# Patient Record
Sex: FEMALE | Race: WHITE
[De-identification: names, ages, dates, MRNs, and addresses within clinical notes are randomized per-mention and may not be internally consistent; named-entity substitution may affect disease eponyms.]

---

## 2018-06-27 ENCOUNTER — HOSPITAL ENCOUNTER (OUTPATIENT)
Age: 83
End: 2018-06-27
Payer: COMMERCIAL

## 2018-06-27 DIAGNOSIS — I10: ICD-10-CM

## 2018-06-27 DIAGNOSIS — M85.851: ICD-10-CM

## 2018-06-27 DIAGNOSIS — E78.5: ICD-10-CM

## 2018-06-27 DIAGNOSIS — Z00.00: Primary | ICD-10-CM

## 2018-06-27 LAB
ALBUMIN SERPL-MCNC: 4.4 G/DL (ref 3.5–5)
ALBUMIN/GLOB SERPL: 1.5 {RATIO} (ref 1–2.8)
ALP SERPL-CCNC: 52 U/L (ref 38–126)
ALT SERPL-CCNC: 26 IU/L (ref 9–52)
BUN SERPL-MCNC: 12 MG/DL (ref 7–17)
CALCIUM SERPL-MCNC: 9.7 MG/DL (ref 8.4–10.2)
CHLORIDE SERPL-SCNC: 104 MMOL/L (ref 98–107)
CHOLEST SERPL-MCNC: 215 MG/DL (ref 140–199)
CO2 SERPL-SCNC: 30 MMOL/L (ref 22–32)
ESTIMATED GLOMERULAR FILT RATE: > 60 ML/MIN (ref 60–?)
GLOBULIN SER CALC-MCNC: 3 G/DL (ref 1.7–4.1)
GLUCOSE SERPL-MCNC: 99 MG/DL (ref 80–110)
HDLC SERPL-MCNC: 80 MG/DL (ref 40–60)
HEMOLYSIS: < 15 (ref 0–50)
POTASSIUM SERPL-SCNC: 4.3 MMOL/L (ref 3.4–5.1)
PROT SERPL-MCNC: 7.4 G/DL (ref 6.3–8.2)
SODIUM SERPL-SCNC: 143 MMOL/L (ref 137–145)
TRIGL SERPL-MCNC: 132 MG/DL (ref 35–150)

## 2018-06-27 PROCEDURE — 36415 COLL VENOUS BLD VENIPUNCTURE: CPT

## 2018-06-27 PROCEDURE — 77080 DXA BONE DENSITY AXIAL: CPT

## 2018-06-27 PROCEDURE — 80061 LIPID PANEL: CPT

## 2018-06-27 PROCEDURE — 80053 COMPREHEN METABOLIC PANEL: CPT

## 2018-11-26 ENCOUNTER — HOSPITAL ENCOUNTER (OUTPATIENT)
Age: 83
End: 2018-11-26
Payer: COMMERCIAL

## 2018-11-26 DIAGNOSIS — M17.12: ICD-10-CM

## 2018-11-26 DIAGNOSIS — M11.262: ICD-10-CM

## 2018-11-26 DIAGNOSIS — M25.562: Primary | ICD-10-CM

## 2018-11-26 PROCEDURE — 73560 X-RAY EXAM OF KNEE 1 OR 2: CPT

## 2018-11-26 NOTE — DI.RAD.S_ITS
PROCEDURE:  XR KNEE LT 1TO2V  
   
INDICATIONS:  PAIN IN LT KNEE  
   
TECHNIQUE: 2 views of the knee were acquired.    
   
COMPARISON:  None.  
   
FINDINGS:    
   
Bones:  No fractures or dislocations.  No suspicious bony lesions.  There is mild to   
moderate lateral compartment joint space narrowing, and chondrocalcinosis is present at   
both the medial and lateral compartments (more prominent laterally).  
   
Soft tissues:  No joint effusion.  No suspicious soft tissue calcifications.    
   
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   
osteoarthritis at the lateral compartment but no trauma found in no effusion or   
intra-articular loose body is seen.  
   
   
   
Dictated by: Brody Trujillo M.D. on 11/26/2018 at 15:52       
Approved by: Brody Trujillo M.D. on 11/26/2018 at 15:52

## 2019-12-11 ENCOUNTER — HOSPITAL ENCOUNTER
Age: 84
End: 2019-12-11
Payer: COMMERCIAL

## 2019-12-11 DIAGNOSIS — E78.5: ICD-10-CM

## 2019-12-11 DIAGNOSIS — I10: Primary | ICD-10-CM

## 2019-12-11 LAB
ALT SERPL-CCNC: 18 IU/L (ref ?–35)
BUN SERPL-MCNC: 14 MG/DL (ref 7–17)
CALCIUM SERPL-MCNC: 10 MG/DL (ref 8.4–10.2)
CHLORIDE SERPL-SCNC: 103 MMOL/L (ref 98–107)
CHOLEST SERPL-MCNC: 216 MG/DL (ref 140–199)
CO2 SERPL-SCNC: 30 MMOL/L (ref 22–32)
ESTIMATED GLOMERULAR FILT RATE: > 60 ML/MIN (ref 60–?)
GLUCOSE SERPL-MCNC: 98 MG/DL (ref 80–110)
HDLC SERPL-MCNC: 72 MG/DL (ref 40–60)
HEMOLYSIS: < 15 (ref 0–50)
POTASSIUM SERPL-SCNC: 4 MMOL/L (ref 3.4–5.1)
SODIUM SERPL-SCNC: 142 MMOL/L (ref 137–145)
TRIGL SERPL-MCNC: 132 MG/DL (ref 35–150)

## 2019-12-11 PROCEDURE — 80048 BASIC METABOLIC PNL TOTAL CA: CPT

## 2019-12-11 PROCEDURE — 84450 TRANSFERASE (AST) (SGOT): CPT

## 2019-12-11 PROCEDURE — 84460 ALANINE AMINO (ALT) (SGPT): CPT

## 2019-12-11 PROCEDURE — 80061 LIPID PANEL: CPT

## 2020-01-03 ENCOUNTER — HOSPITAL ENCOUNTER (EMERGENCY)
Age: 85
Discharge: HOME | End: 2020-01-03
Payer: MEDICARE

## 2020-01-03 VITALS
TEMPERATURE: 97.9 F | SYSTOLIC BLOOD PRESSURE: 148 MMHG | DIASTOLIC BLOOD PRESSURE: 69 MMHG | RESPIRATION RATE: 16 BRPM | OXYGEN SATURATION: 100 % | HEART RATE: 71 BPM

## 2020-01-03 VITALS
HEART RATE: 63 BPM | SYSTOLIC BLOOD PRESSURE: 140 MMHG | OXYGEN SATURATION: 97 % | RESPIRATION RATE: 16 BRPM | DIASTOLIC BLOOD PRESSURE: 64 MMHG

## 2020-01-03 VITALS
OXYGEN SATURATION: 97 % | DIASTOLIC BLOOD PRESSURE: 61 MMHG | HEART RATE: 65 BPM | SYSTOLIC BLOOD PRESSURE: 144 MMHG | RESPIRATION RATE: 16 BRPM

## 2020-01-03 VITALS — DIASTOLIC BLOOD PRESSURE: 67 MMHG | SYSTOLIC BLOOD PRESSURE: 151 MMHG | OXYGEN SATURATION: 8 %

## 2020-01-03 VITALS — OXYGEN SATURATION: 97 % | HEART RATE: 66 BPM

## 2020-01-03 VITALS — BODY MASS INDEX: 22.8 KG/M2

## 2020-01-03 DIAGNOSIS — M25.562: Primary | ICD-10-CM

## 2020-01-03 PROCEDURE — 99284 EMERGENCY DEPT VISIT MOD MDM: CPT

## 2020-01-03 PROCEDURE — 99283 EMERGENCY DEPT VISIT LOW MDM: CPT

## 2020-01-03 PROCEDURE — 73560 X-RAY EXAM OF KNEE 1 OR 2: CPT

## 2020-01-03 NOTE — DI.RAD.S_ITS
PROCEDURE:  XR KNEE LT 1TO2V  
   
INDICATIONS:  left knee swelling and pain  
   
TECHNIQUE: 2 views of the knee were acquired.    
   
COMPARISON:  [facility], , XR KNEE LT 1TO2V, 11/26/2018, 15:26.  
   
FINDINGS:    
   
Bones:  No fractures or dislocations.  No suspicious bony lesions.  Chondrocalcinosis.   
Mild medial compartment joint space loss.  
   
Soft tissues: Small joint effusion.  No suspicious soft tissue calcifications.    
   
IMPRESSION: CPPD arthropathy. No evidence acute bony abnormality of the left knee.  
   
If clinical suspicion and/or symptoms persist, further assessment with repeat plain   
films, or advanced imaging (e.g., CT, MRI, or bone scan) may be helpful for further   
assessment.  
   
   
   
   
   
Dictated by: Angel Celis M.D. on 1/03/2020 at 11:23       
Approved by: Angel Celis M.D. on 1/03/2020 at 11:24

## 2020-01-03 NOTE — ED_ITS
"HPI - Extremity Injury (Lower)    
<Rachel Parmer, FNP-BC - Last Filed: 01/03/20 17:28>    
General    
Chief Complaint: Extremity Injury, Lower    
Stated Complaint: L Knee Pain    
Time Seen by Provider: 01/03/20 11:06    
Source: patient and EMS    
Mode of arrival: EMS    
Limitations: no limitations    
History of Present Illness    
HPI Narrative: The patient is an 84-year-old female nonsmoker with history of   
left meniscus injury who presents with a chief complaint of left knee pain.  She  
states that she had an MRI over the summer, and found a meniscal tear.  She   
states she has a history of chronic left knee pain.  She had a period of slight   
decreased activity due to recent illness, and states that when she started   
moving around again, her left knee her worse than usual.  She states that it   
hurts on the outside of her left knee only with motion or ambulation she denies   
any bruising, falls or injury, erythema.  She denies any recent fevers.    
Related Data    
                                  Previous Rx's    
    
    
    
 Medication  Instructions  Recorded    
     
lidocaine 1 patch TOP DAILY PRN #15 each 01/03/20    
     
prednisone 40 mg PO DAILY #10 tab 01/03/20    
     
tramadol 50 mg PO Q6H PRN #7 tab 01/03/20    
    
    
    
                                    Allergies    
    
    
    
Allergy/AdvReac Type Severity Reaction Status Date / Time    
     
Sulfa (Sulfonamide Allergy Unknown  Verified 01/03/20 12:04    
    
Antibiotics)         
    
    
    
    
Review of Systems    
<Rachel Parmer, FNP-BC - Last Filed: 01/03/20 17:28>    
Review of Systems    
Narrative: GENERAL: Denies chills, fatigue, malaise, fever, sweats.     
HEENT: Denies sinus pain, ear pain, sore throat, difficulty swallowing,   
dizziness.    
RESPIRATORY: Denies dyspnea, cough, wheezing, hemoptysis, sputum.    
CARDIOVASCULAR: Denies chest pain, palpitations, orthopnea, edema,     
GASTROINTESTINAL: Denies nausea, vomiting, abdominal pain, diarrhea,   
constipation, melena.    
: Denies dysuria, frequency, incontinence, hematuria, urinary retention.    
MUSCULOSKELETAL:  See HPI    
SKIN: Denies rash, skin lesions, or other    
NEUROLOGIC: Denies weakness, headache, numbness, change in speech, confusion,   
seizures, incoordination.    
PSYCHIATRIC: No concerning psychosocial issues.    
    
    
12 point review of systems is negative except for those stated above    
    
Patient History    
<Rachel Parmer, FNP-BC - Last Filed: 01/03/20 17:28>    
Social History (Reviewed 01/03/20 @ 17:04 by Rachel Parmer, FNP-BC)    
Smoking Status:  Unknown if ever smoked     
    
    
Smoking Status: Unknown if ever smoked    
alcohol intake frequency: 0-2 drinks per day    
Substance Use Type: does not use    
    
Exam    
<Rachel Parmer, FNP-BC - Last Filed: 01/03/20 17:28>    
Narrative    
Exam Narrative: GENERAL: This is a well-nourished, well-developed patient, in no  
acute distress    
HEAD: Atraumatic. Normocephalic. No temporal or scalp tenderness.    
EYES: Pupils equal round and reactive. Extraocular motions intact. No scleral   
icterus. No injection or drainage.     
ENT: Nose without bleeding, purulent drainage or septal hematoma. Throat without  
erythema, tonsillar hypertrophy or exudate. Uvula midline. Airway patent.    
NECK: Trachea midline. No JVD or lymphadenopathy. Supple, nontender, no   
meningeal signs.    
CARDIOVASCULAR: Regular rate and rhythm without murmurs, gallops, or rubs.     
RESPIRATORY: Clear to auscultation. Breath sounds equal bilaterally. No wheezes,  
rales, or rhonchi.  No cough.  No increased respiratory effort.  No accessory   
muscle use.    
EXTREMITIES:  General pain to palpation noted left knee.  Able to flex and   
extend left knee though decreased range of motion.  Positive pedal pulses   
bilaterally.    
BACK: Nontender without deformity or crepitance. No flank tend
[id number]|2020-01-03 11:34:19|2020-01-03 11:34:19|ED.[facility]EXIN||||"HPI - Extremity Injury (Lower)

## 2020-01-07 ENCOUNTER — HOSPITAL ENCOUNTER
Age: 85
End: 2020-01-07
Payer: MEDICARE

## 2020-01-07 DIAGNOSIS — M25.562: Primary | ICD-10-CM

## 2020-01-07 LAB — URATE SERPL-MCNC: 6.2 MG/DL (ref 2.5–6.2)

## 2020-01-07 PROCEDURE — 84550 ASSAY OF BLOOD/URIC ACID: CPT

## 2020-02-28 ENCOUNTER — HOSPITAL ENCOUNTER (OUTPATIENT)
Age: 85
End: 2020-02-28
Payer: MEDICARE

## 2020-02-28 DIAGNOSIS — N39.0: ICD-10-CM

## 2020-02-28 DIAGNOSIS — Z01.812: ICD-10-CM

## 2020-02-28 DIAGNOSIS — R73.9: ICD-10-CM

## 2020-02-28 DIAGNOSIS — Z01.818: Primary | ICD-10-CM

## 2020-02-28 LAB
ADD MANUAL DIFF / SLIDE REVIEW: NO
APPEARANCE UR: CLEAR
BILIRUBIN URINE UA: NEGATIVE
BUN SERPL-MCNC: 22 MG/DL (ref 7–17)
CALCIUM SERPL-MCNC: 10.4 MG/DL (ref 8.4–10.2)
CHLORIDE SERPL-SCNC: 105 MMOL/L (ref 98–107)
CO2 SERPL-SCNC: 27 MMOL/L (ref 22–32)
COLOR UR: YELLOW
CULTURE INDICATED URINE: (no result)
ESTIMATED GLOMERULAR FILT RATE: 59.7 ML/MIN (ref 60–?)
GLUCOSE SERPL-MCNC: 112 MG/DL (ref 80–110)
GLUCOSE URINE UA: NEGATIVE G/DL
HBA1C MFR BLD: 5.4 % (ref 4–6)
HEMATOCRIT: 36.3 % (ref 36–46)
HEMOGLOBIN: 12.3 G/DL (ref 12–16)
HEMOLYSIS: < 15 (ref 0–50)
HGB UR QL: (no result)
KETONES URINE UA: NEGATIVE
LEUKOCYTE ESTERASE URINE UA: (no result)
LYMPHOCYTES # SPEC AUTO: 1500 /UL (ref 1100–4500)
MCV RBC: 92.2 FL (ref 80–100)
MEAN CORPUSCULAR HEMOGLOBIN: 31.1 PG (ref 26–34)
MEAN CORPUSCULAR HGB CONC: 33.8 % (ref 30–36)
NITRITE URINE UA: NEGATIVE
PH UR: 6.5 [PH] (ref 4.5–8)
PLATELET COUNT: 204 X10^3/UL (ref 150–400)
POTASSIUM SERPL-SCNC: 4.2 MMOL/L (ref 3.4–5.1)
PROTEIN URINE UA: NEGATIVE
SODIUM SERPL-SCNC: 141 MMOL/L (ref 137–145)
SP GR UR: 1.01 (ref 1–1.03)
UROBILINOGEN UR QL: 0.2 E.U./DL

## 2020-02-28 PROCEDURE — 93005 ELECTROCARDIOGRAM TRACING: CPT

## 2020-02-28 PROCEDURE — 81001 URINALYSIS AUTO W/SCOPE: CPT

## 2020-02-28 PROCEDURE — 80048 BASIC METABOLIC PNL TOTAL CA: CPT

## 2020-02-28 PROCEDURE — 87086 URINE CULTURE/COLONY COUNT: CPT

## 2020-02-28 PROCEDURE — 85025 COMPLETE CBC W/AUTO DIFF WBC: CPT

## 2020-02-28 PROCEDURE — 83036 HEMOGLOBIN GLYCOSYLATED A1C: CPT

## 2020-02-28 PROCEDURE — 36415 COLL VENOUS BLD VENIPUNCTURE: CPT

## 2020-12-21 ENCOUNTER — HOSPITAL ENCOUNTER
Age: 85
End: 2020-12-21
Payer: MEDICARE

## 2020-12-21 DIAGNOSIS — R39.9: Primary | ICD-10-CM

## 2020-12-21 DIAGNOSIS — N39.0: ICD-10-CM

## 2020-12-21 PROCEDURE — 87186 SC STD MICRODIL/AGAR DIL: CPT

## 2020-12-21 PROCEDURE — 87086 URINE CULTURE/COLONY COUNT: CPT

## 2020-12-21 PROCEDURE — 87077 CULTURE AEROBIC IDENTIFY: CPT

## 2021-02-22 ENCOUNTER — HOSPITAL ENCOUNTER (OUTPATIENT)
Age: 86
End: 2021-02-22
Payer: MEDICARE

## 2021-02-22 DIAGNOSIS — I65.01: ICD-10-CM

## 2021-02-22 DIAGNOSIS — H53.8: ICD-10-CM

## 2021-02-22 DIAGNOSIS — Z86.73: ICD-10-CM

## 2021-02-22 PROCEDURE — A9579 GAD-BASE MR CONTRAST NOS,1ML: HCPCS

## 2021-02-22 PROCEDURE — 70548 MR ANGIOGRAPHY NECK W/DYE: CPT

## 2021-02-22 PROCEDURE — 70553 MRI BRAIN STEM W/O & W/DYE: CPT

## 2021-02-22 NOTE — DI.MRI.S_ITS
"PROCEDURE:  MR STROKE  
Pre- and post-contrast brain MRI, non-contrast brain MR angiogram, pre- and postcontrast   
neck MR angiogram  
   
INDICATIONS:  Unspecified visual disturbance  
   
TECHNIQUE:    
Brain:  Noncontrast axial T1 spin echo, axial T2 fast spin echo, sagittal and axial   
FLAIR, coronal T2 fast spin echo, axial gradient echo, axial diffusion and ADC through   
the brain.  After the administration of contrast, axial 3D VIBE of the cranial   
vasculature and brain.    
Brain MRA:  Non-contrast 3-D time of flight MR angiogram, with multiple   
maximum-intensity-projection (MIP) reformats performed.    
Neck MRA:  Axial and sagittal TruFISP through the neck.  Coronal dynamic MR angiogram   
during administration of contrast in the arterial and venous phases, with 3-dimenstional   
maximum-intensity-projection (MIP) reformats constructed from subtraction images.    
   
COMPARISON:  [facility], , STROKE PROTOCOL, 10/27/2014, 15:35.  
   
FINDINGS:    
Image quality:  Excellent.    
   
BRAIN:    
CSF spaces:  Ventricles are normal in size and shape.  Basal cisterns are patent.  No   
extra-axial fluid collections.    
Brain:  No intracranial bleeds or mass effects.  Gray-white matter interface is normal.    
Remote appearing occipital lobe infarctions are seen, with volume loss and   
encephalomalacia.  Brain parenchymal volume loss is seen.  Chronic small vessel ischemic   
changes are seen.  Diffusion weighted images show no acute ischemic insults.  Brainstem   
appears normal.  Normal intravascular flow voids are present.  No abnormal intracranial   
enhancement.    
Skull and face:  Calvarial marrow signal is normal.  Orbits appear normal.  Note is made   
of bilateral lens replacements.  
Sinuses:  Sinuses and mastoids are clear.    
   
BRAIN MR ANGIOGRAM:    
Anterior circulation:  Intracranial internal carotid arteries are normal in size and   
enhancement.  The flow within the paired anterior cerebral arteries is normal and   
symmetric.  The flow within the middle cerebral arteries is normal and symmetric.  The   
anterior communicating artery is seen.  No stenoses, occlusions, or aneurysms.    
Posterior circulation:  The distal left vertebral artery is normal.  The distal right   
vertebral artery is not well seen.  There is a patent basilar artery, which is ectatic   
and demonstrates mass effect upon the left aspect of the brainstem, as on series 29,   
image 34. The flow within the posterior cerebral arteries is normal and symmetric.  No   
stenoses, occlusions, or aneurysms.    
   
NECK MR ANGIOGRAM:    
Carotids:  Great vessels demonstrate a conventional anatomy as they arise from the aortic   
arch.  The origins of the common carotid arteries appear patent.  The calibers and   
courses of both common carotid arteries are normal.  The bifurcation regions demonstrate   
atherosclerotic irregularity, yet without a hemodynamically significant stenosis The   
internal carotid arteries demonstrate normal course and caliber.    
Posterior circulation:  There is subtotal stenosis seen involving the origin of the right   
vertebral artery.  There is poor flow seen within the right vertebral artery.  The left   
vertebral artery appears normal.  
Miscellaneous:  Subclavian arteries appear patent.  Pre-contrast images through the neck   
show no soft tissue abnormalities.    
   
   
IMPRESSION:    
   
BRAIN MRI:  Remote bilateral occipital lobe infarctions.  
   
No findings of acute or subacute infarction can be seen.  
   
Note is made of age-appropriate brain parenchymal volume loss and chronic small vessel   
ischemic changes.  
   
No masses or abnormal enhancement can be seen.  
    
   
BRAIN MR ANGIOGRAM:  Ectatic basilar artery, with mass effect upon the left aspect of the   
brainstem.  
   
   
NECK MR ANGIOGRAM:  There is subtotal stenosis seen involving the origin of the right   
vertebral artery.  
   
N
[id number]|2021-02-22 11:23:00|2021-02-22 11:23:00|P.OP.ENDO_ITS|[facility]|Health Information Management|[id number]|"Operative Date/Time/Diagnoses

## 2021-05-13 ENCOUNTER — HOSPITAL ENCOUNTER (OUTPATIENT)
Age: 86
End: 2021-05-13
Payer: MEDICARE

## 2021-05-13 DIAGNOSIS — M25.552: Primary | ICD-10-CM

## 2021-05-13 DIAGNOSIS — Z96.642: ICD-10-CM

## 2021-05-13 PROCEDURE — 73502 X-RAY EXAM HIP UNI 2-3 VIEWS: CPT

## 2021-05-29 ENCOUNTER — HOSPITAL ENCOUNTER (OUTPATIENT)
Age: 86
End: 2021-05-29
Payer: MEDICARE

## 2021-05-29 DIAGNOSIS — M54.16: Primary | ICD-10-CM

## 2021-05-29 PROCEDURE — 72148 MRI LUMBAR SPINE W/O DYE: CPT

## 2021-06-22 ENCOUNTER — HOSPITAL ENCOUNTER (OUTPATIENT)
Age: 86
End: 2021-06-22
Payer: MEDICARE

## 2021-06-22 DIAGNOSIS — M47.9: ICD-10-CM

## 2021-06-22 DIAGNOSIS — Z96.642: ICD-10-CM

## 2021-06-22 DIAGNOSIS — M84.48XA: Primary | ICD-10-CM

## 2021-06-22 PROCEDURE — 78306 BONE IMAGING WHOLE BODY: CPT

## 2021-10-06 ENCOUNTER — HOSPITAL ENCOUNTER (OUTPATIENT)
Age: 86
End: 2021-10-06
Payer: MEDICARE

## 2021-10-06 DIAGNOSIS — M85.851: ICD-10-CM

## 2021-10-06 DIAGNOSIS — M81.0: ICD-10-CM

## 2021-10-06 DIAGNOSIS — M85.88: ICD-10-CM

## 2021-10-06 DIAGNOSIS — M85.80: Primary | ICD-10-CM

## 2021-10-06 DIAGNOSIS — Z78.0: ICD-10-CM

## 2021-10-06 PROCEDURE — 77081 DXA BONE DENSITY APPENDICULR: CPT

## 2021-10-06 PROCEDURE — 77080 DXA BONE DENSITY AXIAL: CPT

## 2022-08-31 ENCOUNTER — HOSPITAL ENCOUNTER (OUTPATIENT)
Age: 87
End: 2022-08-31
Payer: MEDICARE

## 2022-08-31 DIAGNOSIS — I08.1: Primary | ICD-10-CM

## 2022-08-31 DIAGNOSIS — R06.00: ICD-10-CM

## 2022-08-31 PROCEDURE — 93306 TTE W/DOPPLER COMPLETE: CPT

## 2022-12-08 ENCOUNTER — HOSPITAL ENCOUNTER (EMERGENCY)
Age: 87
Discharge: HOME | End: 2022-12-08
Payer: MEDICARE

## 2022-12-08 VITALS
DIASTOLIC BLOOD PRESSURE: 60 MMHG | HEART RATE: 72 BPM | SYSTOLIC BLOOD PRESSURE: 135 MMHG | TEMPERATURE: 98.5 F | OXYGEN SATURATION: 98 %

## 2022-12-08 VITALS
RESPIRATION RATE: 26 BRPM | HEART RATE: 67 BPM | DIASTOLIC BLOOD PRESSURE: 58 MMHG | SYSTOLIC BLOOD PRESSURE: 125 MMHG | OXYGEN SATURATION: 98 % | TEMPERATURE: 97.8 F

## 2022-12-08 VITALS — BODY MASS INDEX: 25 KG/M2

## 2022-12-08 DIAGNOSIS — A04.4: Primary | ICD-10-CM

## 2022-12-08 LAB
ADD MANUAL DIFF / SLIDE REVIEW: NO
ALBUMIN SERPL-MCNC: 4.2 G/DL (ref 3.5–5)
ALBUMIN/GLOB SERPL: 1.5 {RATIO} (ref 1–2.8)
ALP SERPL-CCNC: 35 U/L (ref 38–126)
ALT SERPL-CCNC: 24 IU/L (ref ?–35)
BUN SERPL-MCNC: 23 MG/DL (ref 7–17)
CALCIUM SERPL-MCNC: 9 MG/DL (ref 8.4–10.2)
CHLORIDE SERPL-SCNC: 109 MMOL/L (ref 98–107)
CO2 SERPL-SCNC: 22 MMOL/L (ref 22–32)
ESTIMATED GLOMERULAR FILT RATE: > 60 ML/MIN (ref 60–?)
GLOBULIN SER CALC-MCNC: 2.8 G/DL (ref 1.7–4.1)
GLUCOSE SERPL-MCNC: 114 MG/DL (ref 80–110)
HEMATOCRIT: 35.1 % (ref 36–46)
HEMOGLOBIN: 12.4 G/DL (ref 12–16)
HEMOLYSIS: 63 (ref 0–50)
LYMPHOCYTES # SPEC AUTO: 200 /UL (ref 1100–4500)
MAGNESIUM SERPL-MCNC: 1.8 MG/DL (ref 1.6–2.3)
MCV RBC: 97.2 FL (ref 80–100)
MEAN CORPUSCULAR HEMOGLOBIN: 34.5 PG (ref 26–34)
MEAN CORPUSCULAR HGB CONC: 35.5 % (ref 30–36)
PLATELET COUNT: 76 X10^3/UL (ref 150–400)
POTASSIUM SERPL-SCNC: 3.7 MMOL/L (ref 3.4–5.1)
PROT SERPL-MCNC: 7 G/DL (ref 6.3–8.2)
SODIUM SERPL-SCNC: 140 MMOL/L (ref 137–145)

## 2022-12-08 PROCEDURE — 85025 COMPLETE CBC W/AUTO DIFF WBC: CPT

## 2022-12-08 PROCEDURE — 74177 CT ABD & PELVIS W/CONTRAST: CPT

## 2022-12-08 PROCEDURE — 87633 RESP VIRUS 12-25 TARGETS: CPT

## 2022-12-08 PROCEDURE — 87507 IADNA-DNA/RNA PROBE TQ 12-25: CPT

## 2022-12-08 PROCEDURE — 36415 COLL VENOUS BLD VENIPUNCTURE: CPT

## 2022-12-08 PROCEDURE — 83735 ASSAY OF MAGNESIUM: CPT

## 2022-12-08 PROCEDURE — 80053 COMPREHEN METABOLIC PANEL: CPT

## 2022-12-08 PROCEDURE — 99283 EMERGENCY DEPT VISIT LOW MDM: CPT

## 2023-02-26 ENCOUNTER — HOSPITAL ENCOUNTER (EMERGENCY)
Age: 88
Discharge: HOME | End: 2023-02-26
Payer: MEDICARE

## 2023-02-26 VITALS — OXYGEN SATURATION: 97 % | HEART RATE: 62 BPM

## 2023-02-26 VITALS
SYSTOLIC BLOOD PRESSURE: 161 MMHG | OXYGEN SATURATION: 99 % | RESPIRATION RATE: 23 BRPM | HEART RATE: 66 BPM | DIASTOLIC BLOOD PRESSURE: 83 MMHG

## 2023-02-26 VITALS
SYSTOLIC BLOOD PRESSURE: 167 MMHG | RESPIRATION RATE: 32 BRPM | OXYGEN SATURATION: 100 % | HEART RATE: 62 BPM | DIASTOLIC BLOOD PRESSURE: 78 MMHG

## 2023-02-26 VITALS
RESPIRATION RATE: 33 BRPM | OXYGEN SATURATION: 100 % | SYSTOLIC BLOOD PRESSURE: 142 MMHG | DIASTOLIC BLOOD PRESSURE: 65 MMHG | HEART RATE: 57 BPM

## 2023-02-26 VITALS — DIASTOLIC BLOOD PRESSURE: 84 MMHG | HEART RATE: 62 BPM | SYSTOLIC BLOOD PRESSURE: 174 MMHG | OXYGEN SATURATION: 100 %

## 2023-02-26 VITALS
OXYGEN SATURATION: 100 % | SYSTOLIC BLOOD PRESSURE: 128 MMHG | RESPIRATION RATE: 19 BRPM | HEART RATE: 81 BPM | TEMPERATURE: 97.8 F | DIASTOLIC BLOOD PRESSURE: 87 MMHG

## 2023-02-26 VITALS — BODY MASS INDEX: 22.8 KG/M2

## 2023-02-26 VITALS
DIASTOLIC BLOOD PRESSURE: 63 MMHG | HEART RATE: 57 BPM | RESPIRATION RATE: 22 BRPM | SYSTOLIC BLOOD PRESSURE: 145 MMHG | OXYGEN SATURATION: 100 %

## 2023-02-26 VITALS
DIASTOLIC BLOOD PRESSURE: 80 MMHG | OXYGEN SATURATION: 100 % | HEART RATE: 55 BPM | SYSTOLIC BLOOD PRESSURE: 139 MMHG | RESPIRATION RATE: 18 BRPM

## 2023-02-26 VITALS
SYSTOLIC BLOOD PRESSURE: 170 MMHG | RESPIRATION RATE: 27 BRPM | HEART RATE: 61 BPM | OXYGEN SATURATION: 100 % | DIASTOLIC BLOOD PRESSURE: 89 MMHG

## 2023-02-26 VITALS
DIASTOLIC BLOOD PRESSURE: 77 MMHG | OXYGEN SATURATION: 99 % | SYSTOLIC BLOOD PRESSURE: 168 MMHG | RESPIRATION RATE: 25 BRPM | HEART RATE: 62 BPM

## 2023-02-26 VITALS — HEART RATE: 63 BPM | OXYGEN SATURATION: 98 % | RESPIRATION RATE: 31 BRPM

## 2023-02-26 VITALS
RESPIRATION RATE: 19 BRPM | SYSTOLIC BLOOD PRESSURE: 169 MMHG | HEART RATE: 61 BPM | DIASTOLIC BLOOD PRESSURE: 79 MMHG | OXYGEN SATURATION: 100 %

## 2023-02-26 VITALS
DIASTOLIC BLOOD PRESSURE: 81 MMHG | HEART RATE: 60 BPM | SYSTOLIC BLOOD PRESSURE: 179 MMHG | OXYGEN SATURATION: 99 % | RESPIRATION RATE: 18 BRPM

## 2023-02-26 DIAGNOSIS — N39.0: ICD-10-CM

## 2023-02-26 DIAGNOSIS — Z86.73: ICD-10-CM

## 2023-02-26 DIAGNOSIS — R42: Primary | ICD-10-CM

## 2023-02-26 LAB
ADD MANUAL DIFF / SLIDE REVIEW: NO
ALBUMIN SERPL-MCNC: 4.6 G/DL (ref 3.5–5)
ALBUMIN/GLOB SERPL: 1.4 {RATIO} (ref 1–2.8)
ALP SERPL-CCNC: 49 U/L (ref 38–126)
ALT SERPL-CCNC: 22 IU/L (ref ?–35)
BUN SERPL-MCNC: 26 MG/DL (ref 7–17)
CALCIUM SERPL-MCNC: 9.9 MG/DL (ref 8.4–10.2)
CHLORIDE SERPL-SCNC: 100 MMOL/L (ref 98–107)
CO2 SERPL-SCNC: 29 MMOL/L (ref 22–32)
CULTURE INDICATED URINE: (no result)
ESTIMATED GLOMERULAR FILT RATE: > 60 ML/MIN (ref 60–?)
GLOBULIN SER CALC-MCNC: 3.4 G/DL (ref 1.7–4.1)
GLUCOSE SERPL-MCNC: 100 MG/DL (ref 80–110)
HEMATOCRIT: 36.2 % (ref 36–46)
HEMOGLOBIN: 12 G/DL (ref 12–16)
HEMOLYSIS: < 15 (ref 0–50)
INR PPP: 1 (ref 0.9–1.3)
LYMPHOCYTES # SPEC AUTO: 1800 /UL (ref 1100–4500)
MCV RBC: 90.1 FL (ref 80–100)
MEAN CORPUSCULAR HEMOGLOBIN: 29.9 PG (ref 26–34)
MEAN CORPUSCULAR HGB CONC: 33.2 % (ref 30–36)
PLATELET COUNT: 271 X10^3/UL (ref 150–400)
POTASSIUM SERPL-SCNC: 3.7 MMOL/L (ref 3.4–5.1)
PROT SERPL-MCNC: 8 G/DL (ref 6.3–8.2)
PROTHROMBIN TIME: 11.2 SECONDS (ref 10.1–12.7)
PTT PARTIAL THROMBOPLASTIN TIM: 29 SECONDS (ref 26–36)
SODIUM SERPL-SCNC: 138 MMOL/L (ref 137–145)

## 2023-02-26 PROCEDURE — 81003 URINALYSIS AUTO W/O SCOPE: CPT

## 2023-02-26 PROCEDURE — 80053 COMPREHEN METABOLIC PANEL: CPT

## 2023-02-26 PROCEDURE — 81015 MICROSCOPIC EXAM OF URINE: CPT

## 2023-02-26 PROCEDURE — 36415 COLL VENOUS BLD VENIPUNCTURE: CPT

## 2023-02-26 PROCEDURE — 99284 EMERGENCY DEPT VISIT MOD MDM: CPT

## 2023-02-26 PROCEDURE — 85610 PROTHROMBIN TIME: CPT

## 2023-02-26 PROCEDURE — 85730 THROMBOPLASTIN TIME PARTIAL: CPT

## 2023-02-26 PROCEDURE — 82962 GLUCOSE BLOOD TEST: CPT

## 2023-02-26 PROCEDURE — 85025 COMPLETE CBC W/AUTO DIFF WBC: CPT

## 2023-02-26 PROCEDURE — 93005 ELECTROCARDIOGRAM TRACING: CPT

## 2023-02-26 PROCEDURE — 70450 CT HEAD/BRAIN W/O DYE: CPT

## 2023-02-26 PROCEDURE — 87086 URINE CULTURE/COLONY COUNT: CPT

## 2023-10-27 ENCOUNTER — HOSPITAL ENCOUNTER (OUTPATIENT)
Age: 88
End: 2023-10-27
Payer: COMMERCIAL

## 2023-10-27 DIAGNOSIS — M81.6: Primary | ICD-10-CM

## 2023-10-27 PROCEDURE — 77081 DXA BONE DENSITY APPENDICULR: CPT

## 2023-10-27 PROCEDURE — 77080 DXA BONE DENSITY AXIAL: CPT

## 2025-07-18 ENCOUNTER — HOSPITAL ENCOUNTER (EMERGENCY)
Age: OVER 89
Discharge: HOME | End: 2025-07-18
Payer: MEDICARE

## 2025-07-18 VITALS — BODY MASS INDEX: 23.7 KG/M2

## 2025-07-18 VITALS
SYSTOLIC BLOOD PRESSURE: 181 MMHG | HEART RATE: 98 BPM | OXYGEN SATURATION: 98 % | DIASTOLIC BLOOD PRESSURE: 76 MMHG | RESPIRATION RATE: 16 BRPM

## 2025-07-18 VITALS
SYSTOLIC BLOOD PRESSURE: 166 MMHG | TEMPERATURE: 97.7 F | DIASTOLIC BLOOD PRESSURE: 78 MMHG | OXYGEN SATURATION: 99 % | HEART RATE: 89 BPM | RESPIRATION RATE: 16 BRPM

## 2025-07-18 DIAGNOSIS — R50.9: ICD-10-CM

## 2025-07-18 DIAGNOSIS — J13: Primary | ICD-10-CM

## 2025-07-18 LAB
COVID-19 CEPHEID 4-PLEX PCR: NEGATIVE
FLUAV RNA UPPER RESP QL NAA+PROBE: (no result)
FLUBV RNA UPPER RESP QL NAA+PROBE: (no result)
RSV RNA NPH QL NAA+PROBE: NEGATIVE

## 2025-07-18 PROCEDURE — 87637 SARSCOV2&INF A&B&RSV AMP PRB: CPT

## 2025-07-18 PROCEDURE — 99283 EMERGENCY DEPT VISIT LOW MDM: CPT

## 2025-07-18 PROCEDURE — 71045 X-RAY EXAM CHEST 1 VIEW: CPT

## 2025-07-26 ENCOUNTER — HOSPITAL ENCOUNTER (EMERGENCY)
Age: OVER 89
Discharge: HOME | End: 2025-07-26
Payer: MEDICARE

## 2025-07-26 VITALS
TEMPERATURE: 98.06 F | HEART RATE: 80 BPM | OXYGEN SATURATION: 98 % | SYSTOLIC BLOOD PRESSURE: 120 MMHG | RESPIRATION RATE: 18 BRPM | DIASTOLIC BLOOD PRESSURE: 58 MMHG

## 2025-07-26 VITALS
OXYGEN SATURATION: 97 % | RESPIRATION RATE: 16 BRPM | DIASTOLIC BLOOD PRESSURE: 68 MMHG | TEMPERATURE: 98.1 F | HEART RATE: 76 BPM | SYSTOLIC BLOOD PRESSURE: 126 MMHG

## 2025-07-26 DIAGNOSIS — K80.20: ICD-10-CM

## 2025-07-26 DIAGNOSIS — J90: Primary | ICD-10-CM

## 2025-07-26 DIAGNOSIS — J18.9: ICD-10-CM

## 2025-07-26 LAB
ADD MANUAL DIFF / SLIDE REVIEW: NO
ALBUMIN SERPL-MCNC: 4.2 G/DL (ref 3.5–5)
ALBUMIN/GLOB SERPL: 1.4 {RATIO} (ref 1–2.8)
ALP SERPL-CCNC: 49 U/L (ref 38–126)
ALT SERPL-CCNC: 18 IU/L (ref ?–35)
AST SERPL-CCNC: 22 IU/L (ref 14–36)
BASOPHILS # BLD AUTO: 0 /UL (ref 0–100)
BASOPHILS NFR BLD AUTO: 0.3 % (ref 0–2)
BILIRUB SERPL-MCNC: 0.6 MG/DL (ref 0.2–1.3)
BUN SERPL-MCNC: 19 MG/DL (ref 7–17)
BUN/CREAT SERPL: 19.8 (ref 6–22)
CALCIUM SERPL-MCNC: 9.2 MG/DL (ref 8.4–10.2)
CHLORIDE SERPL-SCNC: 101 MMOL/L (ref 98–107)
CO2 SERPL-SCNC: 27 MMOL/L (ref 22–32)
CREAT SERPL-MCNC: 0.96 MG/DL (ref 0.52–1.04)
D DIMER: 1187 NG/ML (ref ?–500)
EOSINOPHIL # BLD AUTO: 0 /UL (ref 0–450)
EOSINOPHIL NFR BLD AUTO: 0.6 % (ref 2–4)
ESTIMATED GLOMERULAR FILT RATE: 57 ML/MIN (ref 60–?)
GLOBULIN SER CALC-MCNC: 3.1 G/DL (ref 1.7–4.1)
GLUCOSE SERPL-MCNC: 106 MG/DL (ref 70–99)
HEMATOCRIT: 31.5 % (ref 36–46)
HEMOGLOBIN: 11.3 G/DL (ref 12–16)
HEMOLYSIS: < 15 (ref 0–50)
LIPASE SERPL-CCNC: 66 U/L (ref 23–300)
LYMPHOCYTES # SPEC AUTO: 1100 /UL (ref 1100–4500)
LYMPHOCYTES PERCENT AUTO: 17 % (ref 25–40)
MAGNESIUM SERPL-MCNC: 2.2 MG/DL (ref 1.6–2.3)
MCH RBC QN AUTO: 36 PG (ref 26–34)
MCV RBC: 100 FL (ref 80–100)
MEAN CORPUSCULAR HGB CONC: 36 % (ref 30–36)
MONOCYTES # BLD AUTO: 500 /UL (ref 0–900)
MONOCYTES NFR BLD AUTO: 8.5 % (ref 3–14)
NEUTROPHILS # BLD AUTO: 4700 /UL (ref 1500–7000)
NEUTROPHILS NFR BLD AUTO: 73.6 % (ref 50–75)
PLATELET COUNT: 248 X10^3/UL (ref 150–400)
POTASSIUM SERPL-SCNC: 3.5 MMOL/L (ref 3.4–5.1)
PROT SERPL-MCNC: 7.3 G/DL (ref 6.3–8.2)
RED BLOOD CELL COUNT: 3.15 X10^6/UL (ref 4–5.2)
RED CELL DISTRIBUTION WIDTH: 12 % (ref 11.6–14.8)
SODIUM SERPL-SCNC: 137 MMOL/L (ref 137–145)
WHITE BLOOD CELL COUNT: 6.4 X10^3/UL (ref 4.5–11)

## 2025-07-26 PROCEDURE — 83735 ASSAY OF MAGNESIUM: CPT

## 2025-07-26 PROCEDURE — 85025 COMPLETE CBC W/AUTO DIFF WBC: CPT

## 2025-07-26 PROCEDURE — 76705 ECHO EXAM OF ABDOMEN: CPT

## 2025-07-26 PROCEDURE — 85379 FIBRIN DEGRADATION QUANT: CPT

## 2025-07-26 PROCEDURE — 71045 X-RAY EXAM CHEST 1 VIEW: CPT

## 2025-07-26 PROCEDURE — 99283 EMERGENCY DEPT VISIT LOW MDM: CPT

## 2025-07-26 PROCEDURE — 99284 EMERGENCY DEPT VISIT MOD MDM: CPT

## 2025-07-26 PROCEDURE — 36415 COLL VENOUS BLD VENIPUNCTURE: CPT

## 2025-07-26 PROCEDURE — 83690 ASSAY OF LIPASE: CPT

## 2025-07-26 PROCEDURE — 80053 COMPREHEN METABOLIC PANEL: CPT

## 2025-07-26 PROCEDURE — 71275 CT ANGIOGRAPHY CHEST: CPT
